# Patient Record
(demographics unavailable — no encounter records)

---

## 2024-10-08 NOTE — PHYSICAL EXAM
[Well Developed] : well developed [Normal S1, S2] : normal S1, S2 [Clear Lung Fields] : clear lung fields [Good Air Entry] : good air entry [Soft] : abdomen soft [Non Tender] : non-tender [No Cyanosis] : no cyanosis [No Clubbing] : no clubbing [Moves all extremities] : moves all extremities [No Focal Deficits] : no focal deficits [Alert and Oriented] : alert and oriented [Normal memory] : normal memory [de-identified] : JVP <6 cm of H2O, no HJR [de-identified] : 2/6 systolic murmur LUSB  [de-identified] : warm, no edema

## 2024-10-08 NOTE — CARDIOLOGY SUMMARY
[de-identified] : EKG 10/2023: SR, nonspecific, T wave flattening, borderline inferior infarct  [de-identified] : 30 day [facility] 12/2023: no AF  [de-identified] : TTE 4/2024: LV 4.5 cm, mild concentric LVH, normal biventricular function, mild-moderate mitral regurgitation   TTE 10/30/23: LV 5.7 cm, LVEF 25-30% with thinned out anteroseptal wall and severely hypokinetic apex, LVOT VTI 24 cm, normal RV size/function, moderate left atrial enlargement, mild-moderate MR, PASP 48 mmHg, estimated RA pressure 8 mmHg  [de-identified] : CT abdomen/pelvis 10/2023: dilated LV with myocardial thinning and extensive coronary calcifications, interstitial pulmonary edema and pleural effusions, colonic fecal retention, diverticulosis   [de-identified] : [facility] 11/15/23: 95% distal left main, pLAD 95%, ostial LCx 95%, proximal RCA  [facility] 11/15/23: RA 2, PA 26/6, PCWP 5, Nandini CO/CI 5.0/2.8 [de-identified] : CABG 11/16/2023: (MALAVE to LAD, GSV to Ramus, GSV to OM. GSV to PDA) on 11/16/2023

## 2024-10-08 NOTE — ASSESSMENT
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 GSV to PDA) 11/2023, HTN, and HLD presenting to the cardiomyopathy clinic for further management.  Today he reports NYHA I symptoms and appears euvolemic on exam. Will aim to maintain HF remission.

## 2024-10-08 NOTE — DISCUSSION/SUMMARY
[FreeTextEntry1] : # Chronic systolic heart failure - Etiology: ICM, now s/p 4V CABG with recovered EF  - GDMT: current regimen is entresto  mg BID, metoprolol succinate 50 mg daily, farxiga 10 mg daily, spironolactone 25 mg QOD. will continue. still pending Rx coverage so will prescribe samples today, otherwise paying out of pocket  - Diuretic: euvolemic off diuretics  - Device: no indication given recovered EF  - Labs: repeat today with lipids  - completed cardiac rehab 6/2024 - repeat TTE next visit ~4/2025  # Obstructive CAD  - resolved with revascularization  - c/w crestor 40 mg daily and zetia 10 mg daily - check lipids and LPa today, possible lipid clinic referral based on findings if LDL > 55  # Post-op AF - discharged off a/c given short duration of AF, 30 day Ziopatch 12/2023 without AF - stopped amiodarone   Return to clinic in 6 months with TTE

## 2024-10-08 NOTE — HISTORY OF PRESENT ILLNESS
[FreeTextEntry1] : Referring: Dr. 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 GSV to PDA) 11/2023, HTN, and HLD presenting to the cardiomyopathy clinic for further management.  ==========  He was in his usual state of health until 8/2023 when he developed coughing and dyspnea initially attributed to a viral infection as well as some exertional chest tightness. He underwent a CT abdomen 10/2023 for evaluation of constipation which incidentally revealed a dilated LV with coronary artery atherosclerosis, pulmonary edema, and bilateral pleural effusions suggestive of a diagnosis of heart failure. A TTE confirmed severe LV dysfunction. Patient was initially reluctant to pursue angiogram but eventually agreed to R/LHC which revealed severe 3v CAD with LM involvement and normal hemodynamics. He underwent IABP followed by urgent 4v CABG. His post-op course was notable for pAF requiring amiodarone.   As an outpatient, he has done well with resolution of angina and  NYHA 1 symptoms while tolerating high doses GDMT. TTE 4/2024 showed recovered LVEF.   ==========  He presents today for followup. He feels well overall. He denies overt dyspnea on exertion. He is doing cardiac rehab. Denies chest pain or chest pressure. Denies dizziness or lightheadedness. Denies orthopnea or lower extremity edema.

## 2024-10-08 NOTE — SOCIAL HISTORY
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 Denies tobacco/EtOH

## 2025-04-29 NOTE — CARDIOLOGY SUMMARY
[de-identified] : EKG 10/2023: SR, nonspecific, T wave flattening, borderline inferior infarct  [de-identified] : 30 day [facility] 12/2023: no AF  [de-identified] : TTE 4/2025 (prelim review): LV 5.0 cm, prominent septal knuckle, normal biv function  TTE 4/2024: LV 4.5 cm, mild concentric LVH, normal biventricular function, mild-moderate mitral regurgitation   TTE 10/30/23: LV 5.7 cm, LVEF 25-30% with thinned out anteroseptal wall and severely hypokinetic apex, LVOT VTI 24 cm, normal RV size/function, moderate left atrial enlargement, mild-moderate MR, PASP 48 mmHg, estimated RA pressure 8 mmHg  [de-identified] : CT abdomen/pelvis 10/2023: dilated LV with myocardial thinning and extensive coronary calcifications, interstitial pulmonary edema and pleural effusions, colonic fecal retention, diverticulosis   [de-identified] : [facility] 11/15/23: 95% distal left main, pLAD 95%, ostial LCx 95%, proximal RCA  [facility] 11/15/23: RA 2, PA 26/6, PCWP 5, Nandini CO/CI 5.0/2.8 [de-identified] : CABG 11/16/2023: (MALAVE to LAD, GSV to Ramus, GSV to OM. GSV to PDA) on 11/16/2023

## 2025-04-29 NOTE — PHYSICAL EXAM
[Well Developed] : well developed [Normal S1, S2] : normal S1, S2 [Clear Lung Fields] : clear lung fields [Good Air Entry] : good air entry [Soft] : abdomen soft [Non Tender] : non-tender [No Cyanosis] : no cyanosis [No Clubbing] : no clubbing [Moves all extremities] : moves all extremities [No Focal Deficits] : no focal deficits Started first trimester [Alert and Oriented] : alert and oriented [Normal memory] : normal memory [de-identified] : JVP <6 cm of H2O, no HJR [de-identified] : 2/6 systolic murmur LUSB  [de-identified] : warm, no edema

## 2025-04-29 NOTE — DISCUSSION/SUMMARY
[FreeTextEntry1] : # Chronic systolic heart failure - Etiology: ICM, now s/p 4V CABG with recovered EF  - GDMT: current regimen is entresto  mg BID (sometimes takes 49-51 mg based on BP at home), metoprolol succinate 50 mg daily, farxiga 10 mg daily, spironolactone 25 mg QOD. will continue. still pending Rx coverage so will prescribe samples today, otherwise paying out of pocket  - Diuretic: euvolemic off diuretics  - Device: no indication given recovered EF  - Labs: 4/2025 - Completed cardiac rehab 6/2024 - Last TTE: 4/2025, will follow every 2 years   # Obstructive CAD  - angina resolved with revascularization  - ASA 81 mg daily lifelong  - c/w crestor 40 mg daily and zetia 10 mg daily - Lipids: LDL 57 on 4/2025, will follow annually. ideally goal < 55  - discussed dietary strategies and he is currently practicing a vegan diet   # Post-op AF - discharged off a/c given short duration of AF, 30 day Ziopatch 12/2023 without AF - stopped amiodarone   Return to clinic in 12 months

## 2025-04-29 NOTE — ASSESSMENT
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 GSV to PDA) 11/2023, HTN, and HLD presenting to the cardiomyopathy clinic for further management.  Today he reports NYHA I symptoms and appears euvolemic on exam. Will aim to maintain HF remission.

## 2025-04-29 NOTE — SOCIAL HISTORY
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 Denies tobacco/EtOH

## 2025-04-29 NOTE — HISTORY OF PRESENT ILLNESS
[FreeTextEntry1] : Referring: Dr. 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 GSV to PDA) 11/2023, HTN, and HLD presenting to the cardiomyopathy clinic for further management.  ==========  He was in his usual state of health until 8/2023 when he developed coughing and dyspnea initially attributed to a viral infection as well as some exertional chest tightness. He underwent a CT abdomen 10/2023 for evaluation of constipation which incidentally revealed a dilated LV with coronary artery atherosclerosis, pulmonary edema, and bilateral pleural effusions suggestive of a diagnosis of heart failure. A TTE confirmed severe LV dysfunction. Patient was initially reluctant to pursue angiogram but eventually agreed to R/LHC which revealed severe 3v CAD with LM involvement and normal hemodynamics. He underwent IABP followed by urgent 4v CABG. His post-op course was notable for pAF requiring amiodarone.   As an outpatient, he has done well with resolution of angina and NYHA 1 symptoms while tolerating high doses GDMT. His LVEF has now recovered.   ==========  He presents today for followup. He feels well overall. He denies overt dyspnea on exertion. Denies chest pain or chest pressure. Denies dizziness or lightheadedness. Denies orthopnea or lower extremity edema. BP low in the office but usually 100-1110 systolic at home.